# Patient Record
Sex: MALE | Race: BLACK OR AFRICAN AMERICAN | NOT HISPANIC OR LATINO | Employment: FULL TIME | URBAN - METROPOLITAN AREA
[De-identification: names, ages, dates, MRNs, and addresses within clinical notes are randomized per-mention and may not be internally consistent; named-entity substitution may affect disease eponyms.]

---

## 2024-06-02 ENCOUNTER — HOSPITAL ENCOUNTER (EMERGENCY)
Facility: HOSPITAL | Age: 64
Discharge: HOME | End: 2024-06-02
Attending: EMERGENCY MEDICINE

## 2024-06-02 ENCOUNTER — APPOINTMENT (OUTPATIENT)
Dept: RADIOLOGY | Facility: HOSPITAL | Age: 64
End: 2024-06-02

## 2024-06-02 VITALS
SYSTOLIC BLOOD PRESSURE: 140 MMHG | TEMPERATURE: 97.2 F | OXYGEN SATURATION: 95 % | RESPIRATION RATE: 16 BRPM | BODY MASS INDEX: 28 KG/M2 | HEART RATE: 53 BPM | WEIGHT: 200 LBS | HEIGHT: 71 IN | DIASTOLIC BLOOD PRESSURE: 79 MMHG

## 2024-06-02 DIAGNOSIS — N20.0 NEPHROLITHIASIS: Primary | ICD-10-CM

## 2024-06-02 LAB
ALBUMIN SERPL BCP-MCNC: 4.2 G/DL (ref 3.4–5)
ALP SERPL-CCNC: 66 U/L (ref 33–136)
ALT SERPL W P-5'-P-CCNC: 12 U/L (ref 10–52)
ANION GAP SERPL CALC-SCNC: 14 MMOL/L (ref 10–20)
APPEARANCE UR: CLEAR
AST SERPL W P-5'-P-CCNC: 14 U/L (ref 9–39)
BASOPHILS # BLD AUTO: 0.05 X10*3/UL (ref 0–0.1)
BASOPHILS NFR BLD AUTO: 0.5 %
BILIRUB SERPL-MCNC: 0.6 MG/DL (ref 0–1.2)
BILIRUB UR STRIP.AUTO-MCNC: NEGATIVE MG/DL
BUN SERPL-MCNC: 12 MG/DL (ref 6–23)
CALCIUM SERPL-MCNC: 9.1 MG/DL (ref 8.6–10.6)
CHLORIDE SERPL-SCNC: 107 MMOL/L (ref 98–107)
CO2 SERPL-SCNC: 23 MMOL/L (ref 21–32)
COLOR UR: COLORLESS
CREAT SERPL-MCNC: 1.23 MG/DL (ref 0.5–1.3)
EGFRCR SERPLBLD CKD-EPI 2021: 67 ML/MIN/1.73M*2
EOSINOPHIL # BLD AUTO: 0.17 X10*3/UL (ref 0–0.7)
EOSINOPHIL NFR BLD AUTO: 1.7 %
ERYTHROCYTE [DISTWIDTH] IN BLOOD BY AUTOMATED COUNT: 13.7 % (ref 11.5–14.5)
GLUCOSE SERPL-MCNC: 123 MG/DL (ref 74–99)
GLUCOSE UR STRIP.AUTO-MCNC: NORMAL MG/DL
HCT VFR BLD AUTO: 39.6 % (ref 41–52)
HGB BLD-MCNC: 14.5 G/DL (ref 13.5–17.5)
IMM GRANULOCYTES # BLD AUTO: 0.04 X10*3/UL (ref 0–0.7)
IMM GRANULOCYTES NFR BLD AUTO: 0.4 % (ref 0–0.9)
KETONES UR STRIP.AUTO-MCNC: NEGATIVE MG/DL
LACTATE SERPL-SCNC: 2 MMOL/L (ref 0.4–2)
LEUKOCYTE ESTERASE UR QL STRIP.AUTO: NEGATIVE
LIPASE SERPL-CCNC: 13 U/L (ref 9–82)
LYMPHOCYTES # BLD AUTO: 2.13 X10*3/UL (ref 1.2–4.8)
LYMPHOCYTES NFR BLD AUTO: 21.6 %
MAGNESIUM SERPL-MCNC: 1.82 MG/DL (ref 1.6–2.4)
MCH RBC QN AUTO: 30 PG (ref 26–34)
MCHC RBC AUTO-ENTMCNC: 36.6 G/DL (ref 32–36)
MCV RBC AUTO: 82 FL (ref 80–100)
MONOCYTES # BLD AUTO: 0.53 X10*3/UL (ref 0.1–1)
MONOCYTES NFR BLD AUTO: 5.4 %
NEUTROPHILS # BLD AUTO: 6.93 X10*3/UL (ref 1.2–7.7)
NEUTROPHILS NFR BLD AUTO: 70.4 %
NITRITE UR QL STRIP.AUTO: NEGATIVE
NRBC BLD-RTO: 0 /100 WBCS (ref 0–0)
PH UR STRIP.AUTO: 6.5 [PH]
PLATELET # BLD AUTO: 176 X10*3/UL (ref 150–450)
POTASSIUM SERPL-SCNC: 3.5 MMOL/L (ref 3.5–5.3)
PROT SERPL-MCNC: 7.2 G/DL (ref 6.4–8.2)
PROT UR STRIP.AUTO-MCNC: ABNORMAL MG/DL
RBC # BLD AUTO: 4.84 X10*6/UL (ref 4.5–5.9)
RBC # UR STRIP.AUTO: ABNORMAL /UL
RBC #/AREA URNS AUTO: ABNORMAL /HPF
SODIUM SERPL-SCNC: 140 MMOL/L (ref 136–145)
SP GR UR STRIP.AUTO: 1.04
UROBILINOGEN UR STRIP.AUTO-MCNC: NORMAL MG/DL
WBC # BLD AUTO: 9.9 X10*3/UL (ref 4.4–11.3)
WBC #/AREA URNS AUTO: ABNORMAL /HPF

## 2024-06-02 PROCEDURE — 85025 COMPLETE CBC W/AUTO DIFF WBC: CPT

## 2024-06-02 PROCEDURE — 83605 ASSAY OF LACTIC ACID: CPT

## 2024-06-02 PROCEDURE — 2550000001 HC RX 255 CONTRASTS: Performed by: EMERGENCY MEDICINE

## 2024-06-02 PROCEDURE — 36415 COLL VENOUS BLD VENIPUNCTURE: CPT

## 2024-06-02 PROCEDURE — 80053 COMPREHEN METABOLIC PANEL: CPT

## 2024-06-02 PROCEDURE — 74174 CTA ABD&PLVS W/CONTRAST: CPT | Performed by: RADIOLOGY

## 2024-06-02 PROCEDURE — 96375 TX/PRO/DX INJ NEW DRUG ADDON: CPT | Mod: 59

## 2024-06-02 PROCEDURE — 96361 HYDRATE IV INFUSION ADD-ON: CPT

## 2024-06-02 PROCEDURE — 71275 CT ANGIOGRAPHY CHEST: CPT | Performed by: RADIOLOGY

## 2024-06-02 PROCEDURE — 96372 THER/PROPH/DIAG INJ SC/IM: CPT

## 2024-06-02 PROCEDURE — 81001 URINALYSIS AUTO W/SCOPE: CPT

## 2024-06-02 PROCEDURE — 96376 TX/PRO/DX INJ SAME DRUG ADON: CPT | Mod: 59

## 2024-06-02 PROCEDURE — 96374 THER/PROPH/DIAG INJ IV PUSH: CPT | Mod: 59

## 2024-06-02 PROCEDURE — 83690 ASSAY OF LIPASE: CPT

## 2024-06-02 PROCEDURE — 2500000004 HC RX 250 GENERAL PHARMACY W/ HCPCS (ALT 636 FOR OP/ED)

## 2024-06-02 PROCEDURE — 99284 EMERGENCY DEPT VISIT MOD MDM: CPT | Mod: 25

## 2024-06-02 PROCEDURE — 71275 CT ANGIOGRAPHY CHEST: CPT

## 2024-06-02 PROCEDURE — 83735 ASSAY OF MAGNESIUM: CPT

## 2024-06-02 RX ORDER — MORPHINE SULFATE 4 MG/ML
4 INJECTION INTRAVENOUS ONCE
Status: COMPLETED | OUTPATIENT
Start: 2024-06-02 | End: 2024-06-02

## 2024-06-02 RX ORDER — HYDROMORPHONE HYDROCHLORIDE 1 MG/ML
1 INJECTION, SOLUTION INTRAMUSCULAR; INTRAVENOUS; SUBCUTANEOUS ONCE
Status: COMPLETED | OUTPATIENT
Start: 2024-06-02 | End: 2024-06-02

## 2024-06-02 RX ORDER — ONDANSETRON HYDROCHLORIDE 2 MG/ML
4 INJECTION, SOLUTION INTRAVENOUS ONCE
Status: COMPLETED | OUTPATIENT
Start: 2024-06-02 | End: 2024-06-02

## 2024-06-02 RX ORDER — KETOROLAC TROMETHAMINE 15 MG/ML
15 INJECTION, SOLUTION INTRAMUSCULAR; INTRAVENOUS ONCE
Status: COMPLETED | OUTPATIENT
Start: 2024-06-02 | End: 2024-06-02

## 2024-06-02 RX ADMIN — IOHEXOL 90 ML: 350 INJECTION, SOLUTION INTRAVENOUS at 10:54

## 2024-06-02 RX ADMIN — MORPHINE SULFATE 4 MG: 4 INJECTION, SOLUTION INTRAMUSCULAR; INTRAVENOUS at 09:22

## 2024-06-02 RX ADMIN — HYDROMORPHONE HYDROCHLORIDE 1 MG: 1 INJECTION, SOLUTION INTRAMUSCULAR; INTRAVENOUS; SUBCUTANEOUS at 10:09

## 2024-06-02 RX ADMIN — SODIUM CHLORIDE, POTASSIUM CHLORIDE, SODIUM LACTATE AND CALCIUM CHLORIDE 1000 ML: 600; 310; 30; 20 INJECTION, SOLUTION INTRAVENOUS at 12:20

## 2024-06-02 RX ADMIN — ONDANSETRON 4 MG: 2 INJECTION INTRAMUSCULAR; INTRAVENOUS at 09:22

## 2024-06-02 RX ADMIN — KETOROLAC TROMETHAMINE 15 MG: 15 INJECTION, SOLUTION INTRAMUSCULAR; INTRAVENOUS at 12:20

## 2024-06-02 RX ADMIN — HYDROMORPHONE HYDROCHLORIDE 1 MG: 1 INJECTION, SOLUTION INTRAMUSCULAR; INTRAVENOUS; SUBCUTANEOUS at 11:31

## 2024-06-02 ASSESSMENT — PAIN SCALES - GENERAL
PAINLEVEL_OUTOF10: 10 - WORST POSSIBLE PAIN
PAINLEVEL_OUTOF10: 6
PAINLEVEL_OUTOF10: 8

## 2024-06-02 ASSESSMENT — LIFESTYLE VARIABLES
HAVE YOU EVER FELT YOU SHOULD CUT DOWN ON YOUR DRINKING: NO
TOTAL SCORE: 0
EVER FELT BAD OR GUILTY ABOUT YOUR DRINKING: NO
EVER HAD A DRINK FIRST THING IN THE MORNING TO STEADY YOUR NERVES TO GET RID OF A HANGOVER: NO
HAVE PEOPLE ANNOYED YOU BY CRITICIZING YOUR DRINKING: NO

## 2024-06-02 ASSESSMENT — COLUMBIA-SUICIDE SEVERITY RATING SCALE - C-SSRS
2. HAVE YOU ACTUALLY HAD ANY THOUGHTS OF KILLING YOURSELF?: NO
1. IN THE PAST MONTH, HAVE YOU WISHED YOU WERE DEAD OR WISHED YOU COULD GO TO SLEEP AND NOT WAKE UP?: NO
6. HAVE YOU EVER DONE ANYTHING, STARTED TO DO ANYTHING, OR PREPARED TO DO ANYTHING TO END YOUR LIFE?: NO

## 2024-06-02 ASSESSMENT — PAIN - FUNCTIONAL ASSESSMENT: PAIN_FUNCTIONAL_ASSESSMENT: 0-10

## 2024-06-02 ASSESSMENT — PAIN DESCRIPTION - LOCATION: LOCATION: ABDOMEN

## 2024-06-02 ASSESSMENT — PAIN DESCRIPTION - PAIN TYPE: TYPE: ACUTE PAIN

## 2024-06-02 NOTE — CONSULTS
"Reason For Consult  4mm L UVJ stone    History Of Present Illness  Manjit Edwards is a 63 y.o. male with abdominal pain and his work up revealed 4mm L UVJ stone for which urology service was consulted.    He c/o LLQ pain moving towards suprapubic that started this morning associated with nausea and vomiting. He denied fever, hematuria or any urinary symptoms. He denied previous similar episodes. He is on Flomax since 2018 for BPH from OSH.       Past Medical History  He has no past medical history on file.    Surgical History  He has no past surgical history on file.     Social History  He has no history on file for tobacco use, alcohol use, and drug use.    Family History  No family history on file.     Allergies  Penicillins    Review of Systems  reviewed     Physical Exam  General: in no acute distress, non-toxic appearing   : no CVA tenderness  Respiratory: non labored breathing   Cardiovascular: regular rate per chart  Abdomen: soft, mildly-tender in the LLQ, non-distended   Psych: appropriate mood and behavior      Last Recorded Vitals  Blood pressure (!) 197/85, pulse 53, temperature 36 °C (96.8 °F), resp. rate 16, height 1.803 m (5' 11\"), weight 90.7 kg (200 lb), SpO2 97%.    Relevant Results  Wbc: 9.9  Cr: 1.2  Lactate:1.1  UA: collected    CTA abdomen 6/2/2024:  - 4mm L UVJ stone with mild upstream hydro.  - Multiple non obstructing stones in the left kidney  - Perinephric stranding and edema   - Partially duplicated left kidney     Stones details:  -  L posterior upper pole stones:   -7x9 mm ()  -6x5.5 mm ()  - L anterior upper pole stones:  -  7.4x10.6 mm () with 2 adjacent stones around 3 and 4 mm ()  - L lower pole stones:   - 2 small stones next to each other 2 and 3 mm  - L mid pole stone:   - small 2x1.8 mm    - 2x small stones 3.7x4.5 mm () and 2.3x3.8 mm ()      Assessment/Plan   Manjit Edwards is a 63 y.o. male with abdominal pain and his work " up revealed 4mm L UVJ stone for which urology service was consulted.    He has mild hydro, no leukocytosis and normal kidney function. His pain was controlled with pain meds. We discussed that small stones <5mm has high rate of spontaneous passage in addition to his stone location at the UVJ.     Recommendations:  - Patient can be discharged from urology standpoint  - can get diet to ensure no N/V before discharge  - FU UA and if indicative of infection, please send UC and send patient with 5 days of oral abx  - MET (Hydration-Flomax- pain meds)   - FU with urology for stone passing and to discuss management of non obstructing left kidney stones   - Return precaution give (Return to the Emergency Department if you experience worsening pain, fever, painful urination, blood in urine, or any other concerning symptoms.     D/W chief resident Dr. Bina Christensen MD  Urology PGY-2  Pager: [phone]

## 2024-06-02 NOTE — DISCHARGE INSTRUCTIONS
You were seen in the emergency department today and diagnosed with a kidney stone.  You may continue to take Tylenol and ibuprofen at home for pain.  Urology will follow up with you, You may reach out to [phone] if you have not heard back.  Please return to the ED with worsening abdominal pain, fever chills nausea or vomiting.

## 2024-06-02 NOTE — ED PROVIDER NOTES
CC: Abdominal Pain     History provided by: Patient  Limitations to History: None    HPI:  Manjit Edwards is a 63 y.o. male with no past medical history and no meds who presents today for acute onset left-sided pain when he woke up today.  He describes it as sharp and states he is never had pain like this before.  Denies urinary symptoms such as dysuria or hematuria or testicular pain.  No associated GI symptoms, last bowel movement was yesterday.  Denies fevers but feels like he has cold chills associated with the pain.  No nausea or vomiting.  ???????????????????????????????????????????????????????????????  Triage Vitals:  T 36 °C (96.8 °F)  HR 53  BP (!) 197/85  RR 16  O2 97 % None (Room air)    Vital signs reviewed in nursing triage note, EMR flow sheets, and at patient's bedside.   General: Awake, alert, very uncomfortable appearing  Eyes: Gaze conjugate.  No scleral icterus or injection  HENT: Normo-cephalic, atraumatic. No stridor. No rhinorrhea or epistaxis.  CV: Regular rhythm. No murmurs appreciated. Radial pulses 2+ bilaterally  Respiratory: Breathing non-labored, speaking in full sentences.  Clear to auscultation bilaterally  GI: Soft, non-distended, non-tender. No rebound or guarding.  Extreme tenderness to palpation along the left lower quadrant and left flank.  No overlying rash noted.  Voluntary guarding with palpation of the left lower quadrant.  MSK/Extremities: No gross bony deformities. Moving all extremities. No lower extremity edema.   Skin: Warm. Appropriate color  Neuro: Alert. Oriented. Face symmetric. Speech is fluent.  Gross strength and sensation intact in b/l UE and LEs  Psych: Appropriate mood and affect   ???????????????????????????????????????????????????????????????  ED Course/Treatment/Medical Decision Making  MDM:  Manjit Edwards is a 63 y.o. male who presented with acute onset left lower quadrant pain that started this morning.  He is hypertensive on arrival at  197/85, afebrile, saturating well on room air and bradycardic.  Patient provided with 4 mg of morphine for pain control, labs including urinalysis ordered and a CT angio given age and abrupt onset to rule out aortic dissection ordered.  Other differentials include nephrolithiasis, mesenteric ischemia, diverticulosis, obstruction and constipation.  On reevaluation, patient is very uncomfortable, walking around the emergency department and groaning.  Multiple additional doses of Dilaudid offered along with Toradol and IV fluids.      ED Course:  ED Course as of 06/02/24 1525   Sun Jun 02, 2024   1145 1.Marked left-sided hydroureteronephrosis with an obstructing 4 mm calculus at the left ureterovesicular junction. Extensive perinephric  fat stranding and edema with mild cortical thinning and otherwise preserved cortical enhancement of the left kidney, however given the  extent of surrounding inflammatory changes, underlying postobstructive pyelonephritis is not excluded.   2. Multiple additional left-sided intrarenal urinary calculi measuring up to 1.3 cm.  3. Subpleural ground-glass opacities along the posterior left lower lobe likely relates to atelectasis, however underlying infectious or  inflammatory process is also possible.  4. No evidence of significant arterial abnormality.   [AW]   1145 Urology consulted in regards to obstructing nephrolithiasis. Ordered IVF and toradol [AW]   1524 Urinalysis with Reflex Culture and Microscopic(!)  Negative nitrates and leukocyte esterase on urinalysis, some trace blood noted which is expected with nephrolithiasis.  Urology updated who state patient is okay to be discharged home from their perspective. [AW]      ED Course User Index  [AW] Malou Thakkar DO         Diagnoses as of 06/02/24 1525   Nephrolithiasis       The patient was monitored for any change in vital signs or symptoms throughout the ED course.     Social Determinants Limiting Care:  None  identified    Impression:  Left nephrolithiasis    Disposition:  Patient ultimately discharged home after negative urinalysis and small stone.  Urology evaluated the patient at bedside as well and have provided him follow-up in their office.  Expected course discussed with patient and strict return precautions discussed.  All questions answered at bedside.  Hemodynamically stable for discharge    Assessment and plan discussed with Dr. Jones Thakkar DO   Emergency Medicine, PGY-1     Disclaimer: This note was dictated by speech recognition. Minor errors in transcription may be present.     Procedures ? [employer] last updated 6/2/2024 3:25 PM          Malou Thakkar DO  Resident  06/02/24 1521

## 2024-06-02 NOTE — ED TRIAGE NOTES
LLQ pain x 1 hour    Adult hypothyroidism    CAD (coronary artery disease)    Diabetes    Essential hypertension    Hyperlipidemia    Peptic ulcer  s/p treatment for H pylori

## 2024-06-03 LAB — HOLD SPECIMEN: NORMAL

## 2024-06-24 PROBLEM — I35.8 AORTIC HEART MURMUR ON EXAMINATION: Status: ACTIVE | Noted: 2018-04-26

## 2024-06-24 PROBLEM — E66.9 OBESITY (BMI 30-39.9): Status: ACTIVE | Noted: 2018-04-26

## 2024-06-24 PROBLEM — N52.01 ERECTILE DYSFUNCTION DUE TO ARTERIAL INSUFFICIENCY: Status: ACTIVE | Noted: 2018-04-26

## 2024-06-24 PROBLEM — B35.1 ONYCHOMYCOSIS: Status: ACTIVE | Noted: 2024-06-24

## 2024-06-24 PROBLEM — B35.3 TINEA PEDIS OF BOTH FEET: Status: ACTIVE | Noted: 2024-06-24

## 2024-06-24 RX ORDER — TERBINAFINE HYDROCHLORIDE 250 MG/1
1 TABLET ORAL DAILY
COMMUNITY
Start: 2022-05-18

## 2024-06-24 RX ORDER — FLUTICASONE PROPIONATE 50 MCG
1 SPRAY, SUSPENSION (ML) NASAL 2 TIMES DAILY
COMMUNITY
Start: 2024-01-15

## 2024-06-24 RX ORDER — DOXYCYCLINE 100 MG/1
CAPSULE ORAL
COMMUNITY
Start: 2024-01-22

## 2024-06-24 RX ORDER — ATORVASTATIN CALCIUM 20 MG/1
20 TABLET, FILM COATED ORAL
COMMUNITY
Start: 2024-01-23

## 2024-06-24 RX ORDER — ACETAMINOPHEN AND CODEINE PHOSPHATE 300; 60 MG/1; MG/1
1 TABLET ORAL EVERY 4 HOURS PRN
COMMUNITY
Start: 2023-09-01

## 2024-06-24 RX ORDER — BENZONATATE 100 MG/1
CAPSULE ORAL
COMMUNITY
Start: 2024-01-15

## 2024-06-24 RX ORDER — CLINDAMYCIN HYDROCHLORIDE 150 MG/1
CAPSULE ORAL
COMMUNITY
Start: 2024-03-21

## 2024-06-24 RX ORDER — IBUPROFEN 800 MG/1
TABLET ORAL
COMMUNITY
Start: 2024-03-21

## 2024-06-24 RX ORDER — TAMSULOSIN HYDROCHLORIDE 0.4 MG/1
0.4 CAPSULE ORAL
COMMUNITY
Start: 2024-01-15